# Patient Record
Sex: MALE | Race: WHITE | NOT HISPANIC OR LATINO | Employment: OTHER | URBAN - METROPOLITAN AREA
[De-identification: names, ages, dates, MRNs, and addresses within clinical notes are randomized per-mention and may not be internally consistent; named-entity substitution may affect disease eponyms.]

---

## 2017-07-08 ENCOUNTER — HOSPITAL ENCOUNTER (EMERGENCY)
Facility: CLINIC | Age: 42
Discharge: HOME | End: 2017-07-08
Attending: EMERGENCY MEDICINE | Admitting: EMERGENCY MEDICINE
Payer: COMMERCIAL

## 2017-07-08 VITALS
WEIGHT: 233 LBS | BODY MASS INDEX: 33.36 KG/M2 | DIASTOLIC BLOOD PRESSURE: 90 MMHG | SYSTOLIC BLOOD PRESSURE: 135 MMHG | RESPIRATION RATE: 18 BRPM | OXYGEN SATURATION: 99 % | TEMPERATURE: 98.7 F | HEIGHT: 70 IN

## 2017-07-08 DIAGNOSIS — B86 SCABIES: ICD-10-CM

## 2017-07-08 PROCEDURE — 99282 EMERGENCY DEPT VISIT SF MDM: CPT

## 2017-07-08 RX ORDER — MUPIROCIN CALCIUM 20 MG/G
CREAM TOPICAL 3 TIMES DAILY
Qty: 30 G | Refills: 0 | Status: SHIPPED | OUTPATIENT
Start: 2017-07-08

## 2017-07-08 RX ORDER — HYDROXYZINE HYDROCHLORIDE 25 MG/1
25-50 TABLET, FILM COATED ORAL EVERY 6 HOURS PRN
Qty: 30 TABLET | Refills: 0 | Status: SHIPPED | OUTPATIENT
Start: 2017-07-08

## 2017-07-08 RX ORDER — PERMETHRIN 50 MG/G
CREAM TOPICAL ONCE
Qty: 30 G | Refills: 1 | Status: SHIPPED | OUTPATIENT
Start: 2017-07-08 | End: 2017-07-08

## 2017-07-08 ASSESSMENT — ENCOUNTER SYMPTOMS
FEVER: 0
NAUSEA: 0
VOMITING: 0

## 2017-07-08 NOTE — ED AVS SNAPSHOT
Emergency Department    [address] [facility] [address]    Phone:  [phone]    Fax:  [phone]                                       Mario Lester   MRN: [id number]    Department:   Emergency Department   Date of Visit:  7/8/2017           Patient Information     Date Of Birth          1975        Your diagnoses for this visit were:     Scabies        You were seen by Mark Mojica MD.      Follow-up Information     Follow up with [facility].    Contact information:    [address] [address]  [phone]          Follow up with [facility] In 1 week.    Contact information:    [address] [address]  [phone]          Follow up with  Emergency Department.    Specialty:  EMERGENCY MEDICINE    Why:  If symptoms worsen    Contact information:    [address] [place] [address] [phone]        Discharge Instructions         Scabies  Scabies is a skin infection. It is caused by a tiny parasitic insect, or mite, that is too small to see directly. It can be seen under a microscope, but it is usually recognized only by the rash and symptoms it causes. This can make it hard to diagnose since the signs and symptoms can be similar to other diseases.  The scabies mite tunnels under the skin. It creates a small burrow, where it leaves its eggs. These eggs katz and grow into adults. They then create new burrows over the next 1 to 2 weeks. The mites die in about 4 to 6 weeks. The rash and itching are caused by an allergic reaction to the scabies saliva or feces.  Scabies is highly contagious. It is spread by direct skin contact. It is easily spread by close personal contact, sexual contact, or by sharing bed linens or clothing used by an infected person.  It may take 4 to 6 weeks for symptoms to appear after being exposed. Everyone living in  the house with you, as well as your sexual partners, should be treated at the same time. After the first treatment, you will no longer be contagious. You may return to work, school or .  Home care    Machine wash in hot water all sheets, towels, pillowcases, underwear, pajamas, and any other clothing you have worn lately. Use the hot cycle of a dryer or use a hot iron to sterilize.    Seal anything that is hard to wash in a plastic trash bag for 4 days. This includes coats, jackets, blankets, and bedspreads. (The insects die after 3 days off the human body.)  Medicines  Scabicides  Medicines used to treat scabies are called scabicides. These are creams that kill the scabies mites. A prescription is needed. When using these medicines:    Always follow instructions provided by your healthcare provider and pharmacist. Also follow the printed instructions that come with the medicine.    Talk with your provider about precautions to take when using these medicines.    Use the cream on your body when your skin is cool and dry. Don t use it after a hot shower or bath.    Usually the cream is put on your whole body. This means from your chin all the way down to your toes. Scabies does not usually affect an adult s head. So cream is not needed there. For children, discuss this with your child s provider.    Leave the cream or lotion on for the recommended amount of time. This is usually 8 to 12 hours.    Don t leave cream or lotion on your skin longer than directed. Don t use more than recommended.    Clean clothes should be worn after the treatment.    If you wash your hands after using the cream, you will need to reapply the cream to your hands.    If you are breastfeeding, wash off your nipples before feeding. Then reapply the cream after breastfeeding.    For babies or infants, put mittens on their hands. This will stop them from licking the cream or lotion. It will also stop them from scratching themselves because  of the itching.  Other medicines    An oral medicine called ivermectin may be prescribed for severe cases. It may also be used if you can t apply creams.    Itching may cause the most discomfort. If large areas of your skin are affected, over-the-counter antihistamines may be used to reduce itching. Or you may be given a prescription antihistamine. Some of these medicines may make you sleepy. They are best used at bedtime. Antihistamines that don t make you sleepy can be used during the day. Note: Don t use medicine that has diphenhydramine if you have glaucoma, or if you are a man who has trouble urinating due to an enlarged prostate.    If you were given antibiotics due to a bacterial infection, take them until they are finished. It is important to finish the antibiotics even if the wound looks better. This is to make sure the infection has cleared.  Follow-up care  Follow up with your healthcare provider, or as advised. Call your provider if your symptoms don t improve after 1 week, or if new burrows or rashes appear.  When to seek medical advice  Call your healthcare provider right away if any of these occur:    Yellow-brown crusts or drainage from the sores    Other signs of infection, including increasing redness, swelling, pain, or pus    Fever of 100.4 F (38 C) or higher, or as directed by your provider  Date Last Reviewed: 8/1/2016 2000-2017 [employer]. [address] [place] [address]. All rights reserved. This information is not intended as a substitute for professional medical care. Always follow your healthcare professional's instructions.          Discharge References/Attachments     IMPETIGO, UNDERSTANDING (ENGLISH)      24 Hour Appointment Hotline       To make an appointment at any [facility], call [phone] ([phone]). If you don't have a family doctor or clinic, we will help you find one. [facility] clinics are conveniently located to serve the needs of you  and your family.             Review of your medicines      START taking        Dose / Directions Last dose taken    hydrOXYzine 25 MG tablet   Commonly known as:  ATARAX   Dose:  25-50 mg   Quantity:  30 tablet        Take 1-2 tablets (25-50 mg) by mouth every 6 hours as needed for itching   Refills:  0        mupirocin 2 % cream   Commonly known as:  BACTROBAN   Quantity:  30 g        Apply topically 3 times daily   Refills:  0        permethrin 5 % cream   Commonly known as:  ELIMITE   Quantity:  30 g        Apply topically once for 1 dose Massage into skin from head to foot.  Leave on for 8-14hrs and then wash off.  May repeat in 2 wks if needed.   Refills:  1                Prescriptions were sent or printed at these locations (3 Prescriptions)                   Other Prescriptions                Printed at Department/Unit printer (3 of 3)         hydrOXYzine (ATARAX) 25 MG tablet               mupirocin (BACTROBAN) 2 % cream               permethrin (ELIMITE) 5 % cream                Orders Needing Specimen Collection     None      Pending Results     No orders found from 7/6/2017 to 7/9/2017.            Pending Culture Results     No orders found from 7/6/2017 to 7/9/2017.            Pending Results Instructions     If you had any lab results that were not finalized at the time of your Discharge, you can call the ED Lab Result RN at [phone]. You will be contacted by this team for any positive Lab results or changes in treatment. The nurses are available 7 days a week from 10A to 6:30P.  You can leave a message 24 hours per day and they will return your call.        Test Results From Your Hospital Stay               Clinical Quality Measure: Blood Pressure Screening     Your blood pressure was checked while you were in the emergency department today. The last reading we obtained was  BP: 135/90 . Please read the guidelines below about what these numbers mean and what you should do about them.  If your  "systolic blood pressure (the top number) is less than 120 and your diastolic blood pressure (the bottom number) is less than 80, then your blood pressure is normal. There is nothing more that you need to do about it.  If your systolic blood pressure (the top number) is 120-139 or your diastolic blood pressure (the bottom number) is 80-89, your blood pressure may be higher than it should be. You should have your blood pressure rechecked within a year by a primary care provider.  If your systolic blood pressure (the top number) is 140 or greater or your diastolic blood pressure (the bottom number) is 90 or greater, you may have high blood pressure. High blood pressure is treatable, but if left untreated over time it can put you at risk for heart attack, stroke, or kidney failure. You should have your blood pressure rechecked by a primary care provider within the next 4 weeks.  If your provider in the emergency department today gave you specific instructions to follow-up with your doctor or provider even sooner than that, you should follow that instruction and not wait for up to 4 weeks for your follow-up visit.        Thank you for choosing [facility]       Thank you for choosing [facility] for your care. Our goal is always to provide you with excellent care. Hearing back from our patients is one way we can continue to improve our services. Please take a few minutes to complete the written survey that you may receive in the mail after you visit with us. Thank you!        [employer] Information     [employer] lets you send messages to your doctor, view your test results, renew your prescriptions, schedule appointments and more. To sign up, go to [email or web address] . Click on \"Log in\" on the left side of the screen, which will take you to the Welcome page. Then click on \"Sign up Now\" on the right side of the page.     You will be asked to enter the access code listed below, as well as some personal information. Please " follow the directions to create your username and password.     Your access code is: [email or web address]  Expires: 10/6/2017  2:05 AM     Your access code will  in 90 days. If you need help or a new code, please call your [facility] clinic or [phone].        Care EveryWhere ID     This is your Care EveryWhere ID. This could be used by other organizations to access your [facility] medical records  [id number]        Equal Access to Services     TIAN JOSÉ : Hadii aad ku hadasho Soomaali, waaxda luqadaha, qaybta kaalmada adeegyafidelina, merrick resendiz. So [facility] [phone].    ATENCIÓN: Si habla español, tiene a taylor disposición servicios gratuitos de asistencia lingüística. Llame al [phone].    We comply with applicable federal civil rights laws and [place] laws. We do not discriminate on the basis of race, color, national origin, age, disability sex, sexual orientation or gender identity.            After Visit Summary       This is your record. Keep this with you and show to your community pharmacist(s) and doctor(s) at your next visit.

## 2017-07-08 NOTE — ED PROVIDER NOTES
"  History     Chief Complaint:  Rash     HPI   Mario Lester is a previously healthy 42 year old male who presents with an itchy, raised, blistery rash to his right forearm that he noted yesterday on his left forearm, that he believes is scabies. No other rashes. No systemic signs of illness.     Allergies:  No Known Allergies     Medications:    The patient is currently on no regular medications.     Past Medical History:    History reviewed. No pertinent past medical history.    Past Surgical History:    History reviewed. No pertinent surgical history.    Family History:    History is non-contributory.     Social History:  Marital Status:  Single [1]  Smoking status: former  Alcohol status: 4 cans of beer per week   Patient presents alone.  PCP: Hiren Stephenson      Review of Systems   Constitutional: Negative for fever.   Gastrointestinal: Negative for nausea and vomiting.   Skin: Positive for rash.   All other systems reviewed and are negative.      Physical Exam   First Vitals:  BP: 135/90  Heart Rate: 81  Temp: 98.7  F (37.1  C)  Resp: 18  Height: 177.8 cm (5' 10\")  Weight: 105.7 kg (233 lb)  SpO2: 99 %      Physical Exam  Constitutional:  Alert, answers questions appropriately.  Neck: Normal range of motion.   HEENT: Pupils round, equal.   Pulmonary/Chest: Effort normal.   Neurological: No facial asymmetry, gait intact.   Psychiatric: The patient has a normal mood and affect.  Skin:    3x8 cm patch of erythematous, raised scaling skin with some crusting on the left palmar mid forearm, with proximal excoriations, also scaling and mildly red. Hands and fingers normal bilaterally. No other rash noted.    Emergency Department Course     Emergency Department Course:  Nursing notes and vitals reviewed.  I performed an exam of the patient as documented above.     I discussed the findings and treatment plan with the patient. He expressed understanding of this plan and consented to discharge. He will be discharged " home with instructions for care and follow up. In addition, the patient will return to the emergency department if their symptoms persist, worsen, if new symptoms arise or if there is any concern.  All questions were answered.     Impression & Plan      Medical Decision Making:  Mario Lester is a 42 year old male who presents for evaluation of an itchy rash.  Exam shows findings consistent with scabies vs impetigo. The location of the rash is not consistent with scabies, and there is some yellow crusting on a rash with erythematous base, possibly consistent with impetigo. The patient reports however that his symptoms feel exactly like his last round of scabies several years ago. There is no evidence of a systemic infection, anaphylaxis or systemic illness. The patient will be treated with topical Permethrin cream as well as mupirocin cream.  Hydroxyzine was prescribed for symptom control. The plan is for recheck with primary care in 3 days if not improved.  The patient was instructed in decontamination of their home.    Diagnosis:    ICD-10-CM   1. Scabies B86     Disposition:   Discharge     Discharge Medications:  New Prescriptions    HYDROXYZINE (ATARAX) 25 MG TABLET    Take 1-2 tablets (25-50 mg) by mouth every 6 hours as needed for itching    MUPIROCIN (BACTROBAN) 2 % CREAM    Apply topically 3 times daily    PERMETHRIN (ELIMITE) 5 % CREAM    Apply topically once for 1 dose Massage into skin from head to foot.  Leave on for 8-14hrs and then wash off.  May repeat in 2 wks if needed.       Scribe Disclosure:  Amy MADRID, am serving as a scribe at 2:06 AM on 7/8/2017 to document services personally performed by Mark Mojica MD, based on my observations and the provider's statements to me.     EMERGENCY DEPARTMENT       Mark Mojica MD  07/08/17 0682

## 2017-07-08 NOTE — ED AVS SNAPSHOT
Emergency Department    [address] [place] [address]    Phone:  [phone]    Fax:  [phone]                                       Mario Lester   MRN: [id number]    Department:   Emergency Department   Date of Visit:  7/8/2017           After Visit Summary Signature Page     I have received my discharge instructions, and my questions have been answered. I have discussed any challenges I see with this plan with the nurse or doctor.    ..........................................................................................................................................  Patient/Patient Representative Signature      ..........................................................................................................................................  Patient Representative Print Name and Relationship to Patient    ..................................................               ................................................  Date                                            Time    ..........................................................................................................................................  Reviewed by Signature/Title    ...................................................              ..............................................  Date                                                            Time

## 2017-07-08 NOTE — DISCHARGE INSTRUCTIONS
Scabies  Scabies is a skin infection. It is caused by a tiny parasitic insect, or mite, that is too small to see directly. It can be seen under a microscope, but it is usually recognized only by the rash and symptoms it causes. This can make it hard to diagnose since the signs and symptoms can be similar to other diseases.  The scabies mite tunnels under the skin. It creates a small burrow, where it leaves its eggs. These eggs katz and grow into adults. They then create new burrows over the next 1 to 2 weeks. The mites die in about 4 to 6 weeks. The rash and itching are caused by an allergic reaction to the scabies saliva or feces.  Scabies is highly contagious. It is spread by direct skin contact. It is easily spread by close personal contact, sexual contact, or by sharing bed linens or clothing used by an infected person.  It may take 4 to 6 weeks for symptoms to appear after being exposed. Everyone living in the house with you, as well as your sexual partners, should be treated at the same time. After the first treatment, you will no longer be contagious. You may return to work, school or .  Home care    Machine wash in hot water all sheets, towels, pillowcases, underwear, pajamas, and any other clothing you have worn lately. Use the hot cycle of a dryer or use a hot iron to sterilize.    Seal anything that is hard to wash in a plastic trash bag for 4 days. This includes coats, jackets, blankets, and bedspreads. (The insects die after 3 days off the human body.)  Medicines  Scabicides  Medicines used to treat scabies are called scabicides. These are creams that kill the scabies mites. A prescription is needed. When using these medicines:    Always follow instructions provided by your healthcare provider and pharmacist. Also follow the printed instructions that come with the medicine.    Talk with your provider about precautions to take when using these medicines.    Use the cream on your body when your  skin is cool and dry. Don t use it after a hot shower or bath.    Usually the cream is put on your whole body. This means from your chin all the way down to your toes. Scabies does not usually affect an adult s head. So cream is not needed there. For children, discuss this with your child s provider.    Leave the cream or lotion on for the recommended amount of time. This is usually 8 to 12 hours.    Don t leave cream or lotion on your skin longer than directed. Don t use more than recommended.    Clean clothes should be worn after the treatment.    If you wash your hands after using the cream, you will need to reapply the cream to your hands.    If you are breastfeeding, wash off your nipples before feeding. Then reapply the cream after breastfeeding.    For babies or infants, put mittens on their hands. This will stop them from licking the cream or lotion. It will also stop them from scratching themselves because of the itching.  Other medicines    An oral medicine called ivermectin may be prescribed for severe cases. It may also be used if you can t apply creams.    Itching may cause the most discomfort. If large areas of your skin are affected, over-the-counter antihistamines may be used to reduce itching. Or you may be given a prescription antihistamine. Some of these medicines may make you sleepy. They are best used at bedtime. Antihistamines that don t make you sleepy can be used during the day. Note: Don t use medicine that has diphenhydramine if you have glaucoma, or if you are a man who has trouble urinating due to an enlarged prostate.    If you were given antibiotics due to a bacterial infection, take them until they are finished. It is important to finish the antibiotics even if the wound looks better. This is to make sure the infection has cleared.  Follow-up care  Follow up with your healthcare provider, or as advised. Call your provider if your symptoms don t improve after 1 week, or if new burrows  or rashes appear.  When to seek medical advice  Call your healthcare provider right away if any of these occur:    Yellow-brown crusts or drainage from the sores    Other signs of infection, including increasing redness, swelling, pain, or pus    Fever of 100.4 F (38 C) or higher, or as directed by your provider  Date Last Reviewed: 8/1/2016 2000-2017 [employer]. [address] [place] [address]. All rights reserved. This information is not intended as a substitute for professional medical care. Always follow your healthcare professional's instructions.

## 2018-06-24 ENCOUNTER — HOSPITAL ENCOUNTER (EMERGENCY)
Facility: CLINIC | Age: 43
Discharge: HOME | End: 2018-06-25
Attending: EMERGENCY MEDICINE | Admitting: EMERGENCY MEDICINE
Payer: COMMERCIAL

## 2018-06-24 DIAGNOSIS — S01.81XA FACIAL LACERATION, INITIAL ENCOUNTER: ICD-10-CM

## 2018-06-24 PROCEDURE — 99283 EMERGENCY DEPT VISIT LOW MDM: CPT

## 2018-06-24 PROCEDURE — 12013 RPR F/E/E/N/L/M 2.6-5.0 CM: CPT

## 2018-06-24 NOTE — ED AVS SNAPSHOT
Emergency Department    [address] [facility] [address]    Phone:  [phone]    Fax:  [phone]                                       Mario Lester   MRN: [id number]    Department:   Emergency Department   Date of Visit:  6/24/2018           Patient Information     Date Of Birth          1975        Your diagnoses for this visit were:     Facial laceration, initial encounter        You were seen by Melissa Garcia MD.      Follow-up Information     Schedule an appointment as soon as possible for a visit with Hiren Stephenson.    Specialty:  Family Practice    Why:  For suture removal    Contact information:    [facility]  [address]  [place] [address] [phone]          Follow up with  Emergency Department.    Specialty:  EMERGENCY MEDICINE    Contact information:    [address] [facility] [id number] [phone]        Discharge Instructions       Keep dry for first 24 hours then can get wet  Apply vaseline or bacitracin on wound daily  Sutures should dissolve, if not dissolved by 5-7 days, have them removed if it is healed    Discharge Instructions  Laceration (Cut)    You were seen today for a laceration (cut).  Your provider examined your laceration for any problems such a buried foreign body (like glass, a splinter, or gravel), or injury to blood vessels, tendons, and nerves.  Your provider may have also rinsed and/or scrubbed your laceration to help prevent an infection. It may not be possible to find all problems with your laceration on the first visit; occasionally foreign bodies or a tendon injury can go undetected.    Your laceration may have been closed in one of several ways:    No closure: many wounds will heal just fine without closure.    Stitches: regular stitches that require removal.    Staples: skin staples are often used in the scalp/head.    Wound adhesive (glue): skin glue can be used for certain  lacerations and doesn t require removal.    Wound strips (aka Butterfly bandages or steri-strips): these are bandages that help to close a wound.    Absorbable stitches:  dissolving  stitches that go away on their own and usually don t require removal.    A small percentage of wounds will develop an infection regardless of how well the wound is cared for. Antibiotics are generally not indicated to prevent an infection so are only given for a small number of high-risk wounds. Some lacerations are too high risk to close, and are left open to heal because closure can increase the likelihood that an infection will develop.    Remember that all lacerations, no matter how expertly repaired, will cause scarring. We consider many factors, techniques, and materials, in our efforts to provide the best possible cosmetic outcome.    Generally, every Emergency Department visit should have a follow-up clinic visit with either a primary or a specialty clinic/provider. Please follow-up as instructed by your emergency provider today.     Return to the Emergency Department right away if:    You have more redness, swelling, pain, drainage (pus), a bad smell, or red streaking from your laceration as these symptoms could indicate an infection.    You have a fever of 100.4 F or more.    You have bleeding that you cannot stop at home. If your cut starts to bleed, hold pressure on the bleeding area with a clean cloth or put pressure over the bandage.  If the bleeding does not stop after using constant pressure for 30 minutes, you should return to the Emergency Department for further treatment.    An area past the laceration is cool, pale, or blue compared with the other side, or has a slower return of color when squeezed.    Your dressing seems too tight or starts to get uncomfortable or painful. For children, signs of a problem might be irritability or restlessness.    You have loss of normal function or use of an area, such as being  unable to straighten or bend a finger normally.    You have a numb area past the laceration.    Return to the Emergency Department or see your regular provider if:    The laceration starts to come open.     You have something coming out of the cut or a feeling that there is something in the laceration.    Your wound will not heal, or keeps breaking open. There can always be glass, wood, dirt or other things in any wound.  They will not always show up, even on x-rays.  If a wound does not heal, this may be why, and it is important to follow-up with your regular provider.    Home Care:    Take your dressing off in 12-24 hours, or as instructed by your provider, to check your laceration. Remove the dressing sooner if it seems too tight or painful, or if it is getting numb, tingly, or pale past the dressing.    Gently wash your laceration 1-2 times daily with clean water and mild soap. It is okay to shower or run clean water over the laceration, but do not let the laceration soak in water (no swimming).    If your laceration was closed with wound adhesive or strips: pat it dry and leave it open to the air. For all other repairs: after you wash your laceration, or at least 2 times a day, apply antibiotic ointment (such as Neosporin  or Bacitracin ) to the laceration, then cover it with a Band-Aid  or gauze.    Keep the laceration clean. Wear gloves or other protective clothing if you are around dirt.    Follow-up for removal:    If your wound was closed with staples or regular stitches, they need to be removed according to the instructions and timeline specified by your provider today.    If your wound was closed with absorbable ( dissolving ) sutures, they should fall out, dissolve, or not be visible in about one week. If they are still visible, then they should be removed according to the instructions and timeline specified by your provider today.    Scars:  To help minimize scarring:    Wear sunscreen over the healed  laceration when out in the sun.    Massage the area regularly once healed.    You may apply Vitamin E to the healed wound.    Wait. Scars improve in appearance over months and years.    If you were given a prescription for medicine here today, be sure to read all of the information (including the package insert) that comes with your prescription.  This will include important information about the medicine, its side effects, and any warnings that you need to know about.  The pharmacist who fills the prescription can provide more information and answer questions you may have about the medicine.  If you have questions or concerns that the pharmacist cannot address, please call or return to the Emergency Department.       Remember that you can always come back to the Emergency Department if you are not able to see your regular provider in the amount of time listed above, if you get any new symptoms, or if there is anything that worries you.      24 Hour Appointment Hotline       To make an appointment at any [facility], call [phone] ([phone]). If you don't have a family doctor or clinic, we will help you find one. [facility] clinics are conveniently located to serve the needs of you and your family.             Review of your medicines      Our records show that you are taking the medicines listed below. If these are incorrect, please call your family doctor or clinic.        Dose / Directions Last dose taken    hydrOXYzine 25 MG tablet   Commonly known as:  ATARAX   Dose:  25-50 mg   Quantity:  30 tablet        Take 1-2 tablets (25-50 mg) by mouth every 6 hours as needed for itching   Refills:  0        mupirocin 2 % cream   Commonly known as:  BACTROBAN   Quantity:  30 g        Apply topically 3 times daily   Refills:  0                Orders Needing Specimen Collection     None      Pending Results     No orders found for last 3 day(s).            Pending Culture Results     No orders found for last  3 day(s).            Pending Results Instructions     If you had any lab results that were not finalized at the time of your Discharge, you can call the ED Lab Result RN at [phone]. You will be contacted by this team for any positive Lab results or changes in treatment. The nurses are available 7 days a week from 10A to 6:30P.  You can leave a message 24 hours per day and they will return your call.        Test Results From Your Hospital Stay               Clinical Quality Measure: Blood Pressure Screening     Your blood pressure was checked while you were in the emergency department today. The last reading we obtained was  BP: 151/84 . Please read the guidelines below about what these numbers mean and what you should do about them.  If your systolic blood pressure (the top number) is less than 120 and your diastolic blood pressure (the bottom number) is less than 80, then your blood pressure is normal. There is nothing more that you need to do about it.  If your systolic blood pressure (the top number) is 120-139 or your diastolic blood pressure (the bottom number) is 80-89, your blood pressure may be higher than it should be. You should have your blood pressure rechecked within a year by a primary care provider.  If your systolic blood pressure (the top number) is 140 or greater or your diastolic blood pressure (the bottom number) is 90 or greater, you may have high blood pressure. High blood pressure is treatable, but if left untreated over time it can put you at risk for heart attack, stroke, or kidney failure. You should have your blood pressure rechecked by a primary care provider within the next 4 weeks.  If your provider in the emergency department today gave you specific instructions to follow-up with your doctor or provider even sooner than that, you should follow that instruction and not wait for up to 4 weeks for your follow-up visit.        Thank you for choosing [facility]       Thank you for  "choosing [facility] for your care. Our goal is always to provide you with excellent care. Hearing back from our patients is one way we can continue to improve our services. Please take a few minutes to complete the written survey that you may receive in the mail after you visit with us. Thank you!        [employer] Information     [employer] lets you send messages to your doctor, view your test results, renew your prescriptions, schedule appointments and more. To sign up, go to [email or web address] . Click on \"Log in\" on the left side of the screen, which will take you to the Welcome page. Then click on \"Sign up Now\" on the right side of the page.     You will be asked to enter the access code listed below, as well as some personal information. Please follow the directions to create your username and password.     Your access code is: [email or web address]  Expires: 2018  1:25 AM     Your access code will  in 90 days. If you need help or a new code, please call your [facility] clinic or [phone].        Care EveryWhere ID     This is your Care EveryWhere ID. This could be used by other organizations to access your [facility] medical records  [id number]        Equal Access to Services     DUTCH JOSÉ : Silva Zambrano, oscar auguste, layo valerio, merrick resendiz. So [facility] [phone].    ATENCIÓN: Si habla español, tiene a taylor disposición servicios gratuitos de asistencia lingüística. Llame al [phone].    We comply with applicable federal civil rights laws and [place] laws. We do not discriminate on the basis of race, color, national origin, age, disability, sex, sexual orientation, or gender identity.            After Visit Summary       This is your record. Keep this with you and show to your community pharmacist(s) and doctor(s) at your next visit.                  "

## 2018-06-24 NOTE — ED AVS SNAPSHOT
Emergency Department    [address] [place] [address]    Phone:  [phone]    Fax:  [phone]                                       Mario Lester   MRN: [id number]    Department:   Emergency Department   Date of Visit:  6/24/2018           After Visit Summary Signature Page     I have received my discharge instructions, and my questions have been answered. I have discussed any challenges I see with this plan with the nurse or doctor.    ..........................................................................................................................................  Patient/Patient Representative Signature      ..........................................................................................................................................  Patient Representative Print Name and Relationship to Patient    ..................................................               ................................................  Date                                            Time    ..........................................................................................................................................  Reviewed by Signature/Title    ...................................................              ..............................................  Date                                                            Time

## 2018-06-25 VITALS
HEIGHT: 70 IN | TEMPERATURE: 98.5 F | RESPIRATION RATE: 18 BRPM | BODY MASS INDEX: 35.79 KG/M2 | DIASTOLIC BLOOD PRESSURE: 78 MMHG | SYSTOLIC BLOOD PRESSURE: 140 MMHG | OXYGEN SATURATION: 98 % | HEART RATE: 89 BPM | WEIGHT: 250 LBS

## 2018-06-25 NOTE — ED PROVIDER NOTES
"  History     Chief Complaint:    Laceration     HPI   Mario Lester is a 43 year old male who presents with laceration. Sawing a large branch from a tree and a small branch snapped and hit him in the face.  No loss of consciousness, headache or vomiting.  No other injuries.  Tetanus in 2015.     Allergies:   None    Medications:      hydrOXYzine (ATARAX) 25 MG tablet   mupirocin (BACTROBAN) 2 % cream       Past Medical History:    No pertinent medical history    Past Surgical History:    No pertinent surgical history    Family History:    No pertinent family history    Social History:   reports that he has quit smoking. He does not have any smokeless tobacco history on file. He reports that he drinks about 2.4 oz of alcohol per week  He reports that he does not use illicit drugs.    PCP: Hiren Stephenson     Review of Systems  10 point review of systems was obtained and negative other than mentioned above.      Physical Exam     Patient Vitals for the past 24 hrs:   BP Temp Temp src Pulse Heart Rate Resp SpO2 Height Weight   06/25/18 0140 140/78 98.5  F (36.9  C) Oral 89 - 18 98 % - -   06/24/18 2325 151/84 98.5  F (36.9  C) Oral - 91 - 97 % 1.778 m (5' 10\") 113.4 kg (250 lb)        Physical Exam  General: Resting comfortably on the gurney  Eyes:  The pupils are equal and round    Conjunctivae and sclerae are normal  ENT:    Laceration on left side of face extending from corner of mouth up to mid cheek    No facial bone tenderness    No dental trauma  Neck:  Normal range of motion  CV:  Regular rate and rhythm    Skin warm and well perfused   Resp:  Non labored breathing on room air  MS:  Normal muscular tone  Skin:  See ENT exam above  Neuro:   Awake, alert.      GCS 15    Speech is normal and fluent.    Face is symmetric.     Moves all extremities  Psych: Normal affect.  Appropriate interactions.    Emergency Department Course     Procedures:  [facility] Procedure Note        Laceration Repair:  "   Performed by: Melissa Garcia MD  Authorized by: Melissa Garcia MD  Consent given by: Patient who states understanding of the procedure being performed after discussing the risks, benefits and alternatives.    Preparation: Patient was prepped and draped in usual sterile fashion.  Irrigation solution: saline    Body area:face  Laceration length: 4cm  Contamination: The wound is not contaminated.  Foreign bodies:none  Tendon involvement: none  Anesthesia: Local  Local anesthetic: Lidocaine     1%, with epinephrine  Anesthetic total: 3ml    Debridement: none  Skin closure: Closed with 5 x 5.0 fast absorbing gut  Technique: interrupted  Approximation: close  Approximation difficulty: simple    Patient tolerance: Patient tolerated the procedure well with no immediate complications.     Emergency Department Course:  Past medical records, nursing notes, and vitals reviewed.  I performed an exam of the patient and obtained history, as documented above.    I rechecked the patient.     Impression & Plan      Medical Decision Making:  Mario Lester is a 43 year old male presented to the Emergency Department with a laceration.  Given the time of the injury, the wound was felt amenable to primary closure.  After adequate anesthesia was obtained, the wound was thoroughly irrigated and examined.  There is no evidence of muscular, tendon, or bony involvement at this time, nor signs or symptoms of neurovascular compromise.  Additionally, given the mechanism of injury and examination, suspicion for a foreign body was low and imaging was not done.   Wound was repaired as outlined above in the procedure note.    We discussed appropriate wound care instructions including keeping the wound dry for the first 24-48 hours, followed be gentle cleansing with soap and water.  We discussed application of sunscreen to the affected area once scar has formed, to minimize long term scar formation.  Warning signs of infection (erythema,  warmth, worsening pain, drainage of pus) were discussed, which should prompt return to the ER for re-evaluation and the patient verbalized understanding.  Sutures absorbable. Patient was encouraged to return to the ER or follow-up with PCP in the meantime should any new or troubling symptoms develop.No indication for imaging of facial bones or head based on exam/history.    Diagnosis:    ICD-10-CM    1. Facial laceration, initial encounter S01.81XA       Discharge Medications:  New Prescriptions    No medications on file        6/24/2018   Melissa Garcia MD Goertz, Maria Kristine, MD  06/25/18 0428

## 2018-06-25 NOTE — DISCHARGE INSTRUCTIONS
Keep dry for first 24 hours then can get wet  Apply vaseline or bacitracin on wound daily  Sutures should dissolve, if not dissolved by 5-7 days, have them removed if it is healed    Discharge Instructions  Laceration (Cut)    You were seen today for a laceration (cut).  Your provider examined your laceration for any problems such a buried foreign body (like glass, a splinter, or gravel), or injury to blood vessels, tendons, and nerves.  Your provider may have also rinsed and/or scrubbed your laceration to help prevent an infection. It may not be possible to find all problems with your laceration on the first visit; occasionally foreign bodies or a tendon injury can go undetected.    Your laceration may have been closed in one of several ways:    No closure: many wounds will heal just fine without closure.    Stitches: regular stitches that require removal.    Staples: skin staples are often used in the scalp/head.    Wound adhesive (glue): skin glue can be used for certain lacerations and doesn t require removal.    Wound strips (aka Butterfly bandages or steri-strips): these are bandages that help to close a wound.    Absorbable stitches:  dissolving  stitches that go away on their own and usually don t require removal.    A small percentage of wounds will develop an infection regardless of how well the wound is cared for. Antibiotics are generally not indicated to prevent an infection so are only given for a small number of high-risk wounds. Some lacerations are too high risk to close, and are left open to heal because closure can increase the likelihood that an infection will develop.    Remember that all lacerations, no matter how expertly repaired, will cause scarring. We consider many factors, techniques, and materials, in our efforts to provide the best possible cosmetic outcome.    Generally, every Emergency Department visit should have a follow-up clinic visit with either a primary or a specialty  clinic/provider. Please follow-up as instructed by your emergency provider today.     Return to the Emergency Department right away if:    You have more redness, swelling, pain, drainage (pus), a bad smell, or red streaking from your laceration as these symptoms could indicate an infection.    You have a fever of 100.4 F or more.    You have bleeding that you cannot stop at home. If your cut starts to bleed, hold pressure on the bleeding area with a clean cloth or put pressure over the bandage.  If the bleeding does not stop after using constant pressure for 30 minutes, you should return to the Emergency Department for further treatment.    An area past the laceration is cool, pale, or blue compared with the other side, or has a slower return of color when squeezed.    Your dressing seems too tight or starts to get uncomfortable or painful. For children, signs of a problem might be irritability or restlessness.    You have loss of normal function or use of an area, such as being unable to straighten or bend a finger normally.    You have a numb area past the laceration.    Return to the Emergency Department or see your regular provider if:    The laceration starts to come open.     You have something coming out of the cut or a feeling that there is something in the laceration.    Your wound will not heal, or keeps breaking open. There can always be glass, wood, dirt or other things in any wound.  They will not always show up, even on x-rays.  If a wound does not heal, this may be why, and it is important to follow-up with your regular provider.    Home Care:    Take your dressing off in 12-24 hours, or as instructed by your provider, to check your laceration. Remove the dressing sooner if it seems too tight or painful, or if it is getting numb, tingly, or pale past the dressing.    Gently wash your laceration 1-2 times daily with clean water and mild soap. It is okay to shower or run clean water over the laceration,  but do not let the laceration soak in water (no swimming).    If your laceration was closed with wound adhesive or strips: pat it dry and leave it open to the air. For all other repairs: after you wash your laceration, or at least 2 times a day, apply antibiotic ointment (such as Neosporin  or Bacitracin ) to the laceration, then cover it with a Band-Aid  or gauze.    Keep the laceration clean. Wear gloves or other protective clothing if you are around dirt.    Follow-up for removal:    If your wound was closed with staples or regular stitches, they need to be removed according to the instructions and timeline specified by your provider today.    If your wound was closed with absorbable ( dissolving ) sutures, they should fall out, dissolve, or not be visible in about one week. If they are still visible, then they should be removed according to the instructions and timeline specified by your provider today.    Scars:  To help minimize scarring:    Wear sunscreen over the healed laceration when out in the sun.    Massage the area regularly once healed.    You may apply Vitamin E to the healed wound.    Wait. Scars improve in appearance over months and years.    If you were given a prescription for medicine here today, be sure to read all of the information (including the package insert) that comes with your prescription.  This will include important information about the medicine, its side effects, and any warnings that you need to know about.  The pharmacist who fills the prescription can provide more information and answer questions you may have about the medicine.  If you have questions or concerns that the pharmacist cannot address, please call or return to the Emergency Department.       Remember that you can always come back to the Emergency Department if you are not able to see your regular provider in the amount of time listed above, if you get any new symptoms, or if there is anything that worries you.

## 2020-04-22 ENCOUNTER — HOSPITAL ENCOUNTER (EMERGENCY)
Facility: CLINIC | Age: 45
Discharge: HOME | End: 2020-04-22
Attending: EMERGENCY MEDICINE | Admitting: EMERGENCY MEDICINE
Payer: COMMERCIAL

## 2020-04-22 ENCOUNTER — APPOINTMENT (OUTPATIENT)
Dept: MRI IMAGING | Facility: CLINIC | Age: 45
End: 2020-04-22
Attending: EMERGENCY MEDICINE
Payer: COMMERCIAL

## 2020-04-22 VITALS
TEMPERATURE: 98.2 F | WEIGHT: 240 LBS | RESPIRATION RATE: 16 BRPM | SYSTOLIC BLOOD PRESSURE: 142 MMHG | OXYGEN SATURATION: 97 % | HEART RATE: 88 BPM | DIASTOLIC BLOOD PRESSURE: 92 MMHG | BODY MASS INDEX: 33.6 KG/M2 | HEIGHT: 71 IN

## 2020-04-22 DIAGNOSIS — F10.10 ALCOHOL ABUSE: ICD-10-CM

## 2020-04-22 DIAGNOSIS — R20.2 PARESTHESIA OF LEFT FOOT: ICD-10-CM

## 2020-04-22 LAB
ANION GAP SERPL CALCULATED.3IONS-SCNC: 8 MMOL/L (ref 3–14)
BASOPHILS # BLD AUTO: 0 10E9/L (ref 0–0.2)
BASOPHILS NFR BLD AUTO: 0.1 %
BUN SERPL-MCNC: 16 MG/DL (ref 7–30)
CALCIUM SERPL-MCNC: 9.3 MG/DL (ref 8.5–10.1)
CHLORIDE SERPL-SCNC: 105 MMOL/L (ref 94–109)
CO2 SERPL-SCNC: 23 MMOL/L (ref 20–32)
CREAT SERPL-MCNC: 0.68 MG/DL (ref 0.66–1.25)
DIFFERENTIAL METHOD BLD: ABNORMAL
EOSINOPHIL # BLD AUTO: 0 10E9/L (ref 0–0.7)
EOSINOPHIL NFR BLD AUTO: 0.1 %
ERYTHROCYTE [DISTWIDTH] IN BLOOD BY AUTOMATED COUNT: 12.9 % (ref 10–15)
ETHANOL SERPL-MCNC: <0.01 G/DL
GFR SERPL CREATININE-BSD FRML MDRD: >90 ML/MIN/{1.73_M2}
GLUCOSE SERPL-MCNC: 90 MG/DL (ref 70–99)
HCT VFR BLD AUTO: 42.9 % (ref 40–53)
HGB BLD-MCNC: 14.7 G/DL (ref 13.3–17.7)
IMM GRANULOCYTES # BLD: 0.1 10E9/L (ref 0–0.4)
IMM GRANULOCYTES NFR BLD: 0.3 %
LYMPHOCYTES # BLD AUTO: 2.5 10E9/L (ref 0.8–5.3)
LYMPHOCYTES NFR BLD AUTO: 17.1 %
MCH RBC QN AUTO: 30.3 PG (ref 26.5–33)
MCHC RBC AUTO-ENTMCNC: 34.3 G/DL (ref 31.5–36.5)
MCV RBC AUTO: 89 FL (ref 78–100)
MONOCYTES # BLD AUTO: 1.4 10E9/L (ref 0–1.3)
MONOCYTES NFR BLD AUTO: 10 %
NEUTROPHILS # BLD AUTO: 10.5 10E9/L (ref 1.6–8.3)
NEUTROPHILS NFR BLD AUTO: 72.4 %
NRBC # BLD AUTO: 0 10*3/UL
NRBC BLD AUTO-RTO: 0 /100
PLATELET # BLD AUTO: 228 10E9/L (ref 150–450)
POTASSIUM SERPL-SCNC: 4 MMOL/L (ref 3.4–5.3)
RBC # BLD AUTO: 4.85 10E12/L (ref 4.4–5.9)
SODIUM SERPL-SCNC: 136 MMOL/L (ref 133–144)
WBC # BLD AUTO: 14.4 10E9/L (ref 4–11)

## 2020-04-22 PROCEDURE — 80320 DRUG SCREEN QUANTALCOHOLS: CPT | Performed by: EMERGENCY MEDICINE

## 2020-04-22 PROCEDURE — 85025 COMPLETE CBC W/AUTO DIFF WBC: CPT | Performed by: EMERGENCY MEDICINE

## 2020-04-22 PROCEDURE — 99284 EMERGENCY DEPT VISIT MOD MDM: CPT | Mod: 25

## 2020-04-22 PROCEDURE — 80048 BASIC METABOLIC PNL TOTAL CA: CPT | Performed by: EMERGENCY MEDICINE

## 2020-04-22 PROCEDURE — 70551 MRI BRAIN STEM W/O DYE: CPT

## 2020-04-22 ASSESSMENT — ENCOUNTER SYMPTOMS
SHORTNESS OF BREATH: 0
BACK PAIN: 0
NUMBNESS: 1
FEVER: 0
ABDOMINAL PAIN: 0
DIFFICULTY URINATING: 0

## 2020-04-22 ASSESSMENT — MIFFLIN-ST. JEOR: SCORE: 1997.82

## 2020-04-22 NOTE — ED AVS SNAPSHOT
Emergency Department  [address] [place] [address]  Phone:  [phone]  Fax:  [phone]                                    Mario Lester   MRN: [id number]    Department:   Emergency Department   Date of Visit:  4/22/2020           After Visit Summary Signature Page    I have received my discharge instructions, and my questions have been answered. I have discussed any challenges I see with this plan with the nurse or doctor.    ..........................................................................................................................................  Patient/Patient Representative Signature      ..........................................................................................................................................  Patient Representative Print Name and Relationship to Patient    ..................................................               ................................................  Date                                   Time    ..........................................................................................................................................  Reviewed by Signature/Title    ...................................................              ..............................................  Date                                               Time          22EPIC Rev 08/18

## 2020-04-22 NOTE — ED TRIAGE NOTES
Pt states left foot limp with numbness - starting this morning while out in the garage - pt ambulated with pain - denies any other symptoms

## 2020-04-22 NOTE — ED PROVIDER NOTES
"  History     Chief Complaint:    Numbness      HPI   Mario Lester is a 45 year old male who presents with left foot \"numbness\". Patient notes that he drinks a 6-pack of beer per day and developed left foot numbness around 3:00 AM last night after he had been sitting on his left foot for over an hour. He went to bed and woke up and had persistent paresthesia to his left foot. No history of trauma. No fever or other symptoms reported. No history of diabetes.    Allergies:  No Known Drug Allergies     Medications:    The patient is currently on no regular medications.     Past Medical History:    Chemical dependency    Past Surgical History:    History reviewed. No pertinent past surgical history.     Family History:    History reviewed. No pertinent family history.      Social History:  The patient was not accompanied to the ED  Smoking Status: Former smoker  Smokeless Tobacco: No  Alcohol Use: Yes    Marital Status:  Single      Review of Systems   Constitutional: Negative for fever.   Respiratory: Negative for shortness of breath.    Cardiovascular: Negative for chest pain.   Gastrointestinal: Negative for abdominal pain.   Genitourinary: Negative for difficulty urinating.   Musculoskeletal: Negative for back pain.   Neurological: Positive for numbness (Left foot).   All other systems reviewed and are negative.    Physical Exam     Patient Vitals for the past 24 hrs:   BP Temp Temp src Pulse Heart Rate Resp SpO2 Height Weight   04/22/20 1100 (!) 135/94 -- -- 88 -- -- 96 % -- --   04/22/20 1045 (!) 145/92 -- -- 82 -- -- 96 % -- --   04/22/20 1000 (!) 174/107 -- -- 92 -- -- -- -- --   04/22/20 0945 (!) 157/86 -- -- 93 -- -- 96 % -- --   04/22/20 0930 (!) 163/82 -- -- 93 -- -- 98 % -- --   04/22/20 0832 (!) 159/103 98.2  F (36.8  C) Oral 110 110 16 95 % 1.791 m (5' 10.5\") 108.9 kg (240 lb)       Physical Exam  General: Alert and cooperative with exam. Patient in mild distress. Normal mentation. Anxious " appearance  Head:  Scalp is NC/AT  Eyes:  No scleral icterus, PERRL, EOMI   ENT:  The external nose and ears are normal. The oropharynx is normal and without erythema; mucus membranes are moist.   Neck:  Normal range of motion without rigidity.  CV:  Regular rate and rhythm    Resp:  Breath sounds are clear bilaterally    Non-labored, no retractions or accessory muscle use  GI:  Abdomen is soft, no distension, no tenderness. No peritoneal signs  MS:  No lower extremity edema     No midline cervical, thoracic, or lumbar tenderness  Skin:  Warm and dry, No rash or lesions noted.  Neuro: Oriented x 3. No gross motor deficits.    Strength and sensation grossly intact in all 4 extremities with exception of paresthesia reported to left foot, no swelling or skin changes. No foot drop     Cranial nerves 2-12 intact.    GCS: 15. Normal LE reflexes    Gait normal          Emergency Department Course     Imaging:  Radiology findings were communicated with the patient who voiced understanding of the findings.    MR Brain w/o Contrast:  pending  Report per radiology.    Laboratory:  Laboratory findings were communicated with the patient who voiced understanding of the findings.    CBC: WBC 14.4 (H), HGB 14.7,   BMP: AWNL (Creatinine 0.68)    Alcohol ethyl: <0.01     Procedures  None.    Emergency Department Course:  Past medical records, nursing notes, and vitals reviewed.    0841: I performed an exam of the patient as documented above.     IV was inserted and blood was drawn for laboratory testing, results above.    1116: I spoke with Dr. Patel of the Neurology service regarding patient's presentation, findings, and plan of care.    The patient was sent for a brain MRI while in the emergency department, results above.     1314: I rechecked the patient and discussed the results of his workup thus far.     The patient was signed out to Dr. Valdes, oncoming ED physician, pending MRI results    I personally reviewed  the laboratory results with the Patient and answered all related questions prior to sign out.     Impression & Plan     Medical Decision Making:  Patient is a 45-year-old male who presents with left foot paresthesia. Patient's medical history and records were reviewed. On evaluation, the patient reports paresthesia to his left foot and ankle without overlying skin changes. Patient has normal pulses and no evidence of infection or concern for DVT.  There is no evidence of trauma or indication for imaging of the foot. Neurologic exam is otherwise unremarkable. Labs notable for mildly elevated white count; patient without infectious symptoms. Case was discussed with the stroke neurology fellow who recommended brain MRI without contrast for further evaluation; this is pending. Patient has no back pain or history of injury to necessitate lumbar imaging at this time; no red flag symptoms. Patient has no lower extremity weakness or evidence of foot drop. He will be signed out to my partner, Dr. Valdes, pending MRI results. If MRI is negative, patient can follow-up as an outpatient with the [facility] and neurology. Return precautions were discussed with the patient. It is possible patient may be experiencing neuropathy secondary to daily alcohol usage (alcohol use counseling provided to the patient) or possible neuropraxia secondary to compression from prolonged sitting on his foot last night.    Diagnosis:    ICD-10-CM    1. Paresthesia of left foot  R20.2    2. Alcohol abuse  F10.10        Disposition:  Signed out to Dr. Valdes, pending MRI results      Scribe Disclosure:  Meghan MADRID, am serving as a scribe at 8:41 AM on 4/22/2020 to document services personally performed by Cristobal Morales DO based on my observations and the provider's statements to me.      4/22/2020    EMERGENCY DEPARTMENT       Cristobal Morales DO  04/22/20 4222

## 2020-04-22 NOTE — ED NOTES
Patient called requesting results of the MRI this morning and stating that he had to leave before the results were known.  I conferred with Dr Valdes and a normal MRI result was relayed to the patient.  AVS was printed and patient states that he will come by to pick it up.

## 2020-12-28 ENCOUNTER — OFFICE VISIT - HEALTHEAST (OUTPATIENT)
Dept: FAMILY MEDICINE | Facility: CLINIC | Age: 45
End: 2020-12-28

## 2020-12-28 DIAGNOSIS — M95.4 RIB DEFORMITY: ICD-10-CM

## 2020-12-28 DIAGNOSIS — I10 ESSENTIAL HYPERTENSION, BENIGN: ICD-10-CM

## 2020-12-28 DIAGNOSIS — R91.1 PULMONARY NODULE, LEFT: ICD-10-CM

## 2020-12-28 ASSESSMENT — MIFFLIN-ST. JEOR: SCORE: 2118.7

## 2021-06-05 VITALS
SYSTOLIC BLOOD PRESSURE: 159 MMHG | BODY MASS INDEX: 34.33 KG/M2 | DIASTOLIC BLOOD PRESSURE: 96 MMHG | WEIGHT: 259 LBS | HEIGHT: 73 IN | HEART RATE: 102 BPM | TEMPERATURE: 100.4 F

## 2021-06-16 PROBLEM — I10 ESSENTIAL HYPERTENSION, BENIGN: Status: ACTIVE | Noted: 2020-12-28

## 2022-10-07 ENCOUNTER — HOSPITAL ENCOUNTER (EMERGENCY)
Facility: CLINIC | Age: 47
Discharge: HOME | End: 2022-10-07
Attending: EMERGENCY MEDICINE | Admitting: EMERGENCY MEDICINE
Payer: COMMERCIAL

## 2022-10-07 VITALS
TEMPERATURE: 97.7 F | DIASTOLIC BLOOD PRESSURE: 93 MMHG | OXYGEN SATURATION: 99 % | RESPIRATION RATE: 16 BRPM | SYSTOLIC BLOOD PRESSURE: 157 MMHG | HEART RATE: 67 BPM

## 2022-10-07 DIAGNOSIS — M70.21 OLECRANON BURSITIS OF RIGHT ELBOW: ICD-10-CM

## 2022-10-07 PROCEDURE — 99284 EMERGENCY DEPT VISIT MOD MDM: CPT

## 2022-10-07 RX ORDER — CEPHALEXIN 500 MG/1
500 CAPSULE ORAL 4 TIMES DAILY
Qty: 40 CAPSULE | Refills: 0 | Status: SHIPPED | OUTPATIENT
Start: 2022-10-07 | End: 2022-10-15

## 2022-10-07 RX ORDER — SULFAMETHOXAZOLE/TRIMETHOPRIM 800-160 MG
2 TABLET ORAL 2 TIMES DAILY
Qty: 40 TABLET | Refills: 0 | Status: SHIPPED | OUTPATIENT
Start: 2022-10-07 | End: 2022-10-15

## 2022-10-07 ASSESSMENT — ENCOUNTER SYMPTOMS
ARTHRALGIAS: 1
FEVER: 0

## 2022-10-07 ASSESSMENT — ACTIVITIES OF DAILY LIVING (ADL): ADLS_ACUITY_SCORE: 35

## 2022-10-08 NOTE — ED TRIAGE NOTES
Squeezed a pimple type bump on his right elbow.  It is now red, swollen, and painful.     Triage Assessment     Row Name 10/07/22 1525       Triage Assessment (Adult)    Airway WDL WDL       Respiratory WDL    Respiratory WDL WDL       Skin Circulation/Temperature WDL    Skin Circulation/Temperature WDL WDL       Cardiac WDL    Cardiac WDL WDL       Peripheral/Neurovascular WDL    Peripheral Neurovascular WDL WDL       Cognitive/Neuro/Behavioral WDL    Cognitive/Neuro/Behavioral WDL WDL

## 2022-10-08 NOTE — ED PROVIDER NOTES
History   Chief Complaint:  Elbow Pain       HPI   Mario Lester is a 47 year old male with history of hypertension who presents with the acute onset of pain in his left elbow about 2 hours ago. He felt like he had a zit on his elbow and went to pop it, and his pain followed this. He is able to move his elbow and reports no changes to his sensation. He denies fever.     Review of Systems   Constitutional: Negative for fever.   Musculoskeletal: Positive for arthralgias.   All other systems reviewed and are negative.      Allergies:  No Known Allergies    Medications:  The patient denies taking any medications.     Past Medical History:     Hypertension   Pulmonary nodule   Alcohol abuse   Depression  Chemical dependency     Social History:  The patient presents to the ED by means of car.   PCP: Hiren Stephenson       Physical Exam     Patient Vitals for the past 24 hrs:   BP Temp Temp src Pulse Resp SpO2   10/07/22 2146 (!) 157/93 97.7  F (36.5  C) Temporal 67 16 99 %       Physical Exam  VS: Reviewed per above  HENT: normal speech  EYES: sclera anicteric  CV: Rate as noted, regular rhythm.   RESP: Effort normal.   NEURO: Alert, moving all extremities, sensation intact to light touch in the right upper extremity distally.  5-5 strength in right upper extremity.  MSK: No deformity of the extremities, mild redness and warmth and tenderness about the right olecranon bursa region.  Able to active and passively range the right elbow without significant discomfort.  Intact right radial pulse.  SKIN: Warm and dry      Emergency Department Course     Emergency Department Course:  Reviewed:  I reviewed nursing notes, vitals, past medical history and Care Everywhere    Assessments:  2323 I obtained history and examined the patient as noted above.     Disposition:  The patient was discharged to home.     Impression & Plan     Medical Decision Making:  Patient presents to the ER for evaluation of acute onset pain and redness  "and warmth of the right elbow after attempting to \"popped a zit\" in this region.  Vital signs reassuring.  History not supportive of occult elbow fracture or dislocation.  X-ray imaging not pursued.  There is some mild redness and warmth of the olecranon bursa region.  No clinical joint effusion.  Low suspicion for septic arthropathy.  Will provide antibiotics for possible infectious component of olecranon bursitis.  Recommended rest and compression and follow-up in primary care.  Return precaution discussed prior to discharge.    Diagnosis:    ICD-10-CM    1. Olecranon bursitis of right elbow  M70.21        Discharge Medications:  Discharge Medication List as of 10/7/2022 11:38 PM      START taking these medications    Details   cephALEXin (KEFLEX) 500 MG capsule Take 1 capsule (500 mg) by mouth 4 times daily for 10 days, Disp-40 capsule, R-0, E-Prescribe      sulfamethoxazole-trimethoprim (BACTRIM DS) 800-160 MG tablet Take 2 tablets by mouth 2 times daily for 10 days, Disp-40 tablet, R-0, E-Prescribe             Scribe Disclosure:  I, Joshua Kjer, am serving as a scribe at 11:23 PM on 10/7/2022 to document services personally performed by Grover Charles MD based on my observations and the provider's statements to me.            Grover Charles MD  10/08/22 0103    "

## 2022-10-15 ENCOUNTER — HOSPITAL ENCOUNTER (EMERGENCY)
Facility: CLINIC | Age: 47
Discharge: HOME | End: 2022-10-15
Attending: EMERGENCY MEDICINE | Admitting: EMERGENCY MEDICINE
Payer: COMMERCIAL

## 2022-10-15 VITALS
TEMPERATURE: 98.6 F | DIASTOLIC BLOOD PRESSURE: 89 MMHG | BODY MASS INDEX: 33.6 KG/M2 | HEART RATE: 96 BPM | HEIGHT: 71 IN | SYSTOLIC BLOOD PRESSURE: 146 MMHG | WEIGHT: 240 LBS | OXYGEN SATURATION: 98 % | RESPIRATION RATE: 16 BRPM

## 2022-10-15 DIAGNOSIS — L27.0 ALLERGIC DRUG RASH: ICD-10-CM

## 2022-10-15 PROCEDURE — 250N000012 HC RX MED GY IP 250 OP 636 PS 637: Performed by: EMERGENCY MEDICINE

## 2022-10-15 PROCEDURE — 99283 EMERGENCY DEPT VISIT LOW MDM: CPT

## 2022-10-15 PROCEDURE — 250N000013 HC RX MED GY IP 250 OP 250 PS 637: Performed by: EMERGENCY MEDICINE

## 2022-10-15 RX ORDER — PREDNISONE 20 MG/1
60 TABLET ORAL DAILY
Qty: 12 TABLET | Refills: 0 | Status: SHIPPED | OUTPATIENT
Start: 2022-10-15 | End: 2022-10-19

## 2022-10-15 RX ORDER — DIPHENHYDRAMINE HCL 25 MG
25 CAPSULE ORAL ONCE
Status: COMPLETED | OUTPATIENT
Start: 2022-10-15 | End: 2022-10-15

## 2022-10-15 RX ORDER — PREDNISONE 20 MG/1
60 TABLET ORAL ONCE
Status: COMPLETED | OUTPATIENT
Start: 2022-10-15 | End: 2022-10-15

## 2022-10-15 RX ADMIN — PREDNISONE 60 MG: 20 TABLET ORAL at 16:23

## 2022-10-15 RX ADMIN — DIPHENHYDRAMINE HYDROCHLORIDE 25 MG: 25 CAPSULE ORAL at 16:24

## 2022-10-15 ASSESSMENT — ENCOUNTER SYMPTOMS
ABDOMINAL PAIN: 1
FATIGUE: 1
CHILLS: 1
WHEEZING: 0

## 2022-10-15 NOTE — ED TRIAGE NOTES
Pt started taking meds for bursitis 8 days ago.  Today woke up with hives all over body.  Has been feeling fatigued for past few days.  No respiratory distress.     Triage Assessment     Row Name 10/15/22 4559       Triage Assessment (Adult)    Airway WDL WDL       Respiratory WDL    Respiratory WDL WDL       Skin Circulation/Temperature WDL    Skin Circulation/Temperature WDL --  covered in hives       Cardiac WDL    Cardiac WDL WDL       Peripheral/Neurovascular WDL    Peripheral Neurovascular WDL WDL       Cognitive/Neuro/Behavioral WDL    Cognitive/Neuro/Behavioral WDL WDL

## 2022-10-15 NOTE — ED PROVIDER NOTES
"  History   Chief Complaint:  Allergic Reaction    The history is provided by the patient.      Mario Lester is a 47 year old male, otherwise well, who presents with a full-body rash beginning this morning 8 days after the patient began a course of Keflex and Bactrim. The patient was seen here around a week ago (10/07) for olecranon bursitis of his right elbow. He endorses fatigue and chills over the course of the past 3 days that broke and was replaced by the rash today. His rash was itching slightly after getting out of the shower but he denies any persisting problems. He denies any swelling in his lips or tongue. He also denies any wheezing. Of note, the patient last took his medications around 4 hours ago today. He typically takes his Bactrim in the morning and before bed with his Keflex taken as one pill throughout the day.    Review of Systems   Constitutional: Positive for chills and fatigue.   HENT:        (-) lip swelling  (-) tongue swelling   Respiratory: Negative for wheezing.    Gastrointestinal: Positive for abdominal pain.   Skin: Positive for rash (full-body).   All other systems reviewed and are negative.    Allergies:  Keflex  Bactrim    Medications:  Keflex  Atarax  Bactroban  Bacrim    Past Medical History:     Left pulmonary nodule  Alcohol abuse  Depression  Chemical dependency  Benign essential hypertension    Social History:  The patient presents to the ED on his own.  The patient presents to the ED in a private vehicle.  PCP: No Ref-Primary, Physician     Physical Exam     Patient Vitals for the past 24 hrs:   BP Temp Pulse Resp SpO2 Height Weight   10/15/22 1638 -- -- 96 16 98 % -- --   10/15/22 1613 (!) 146/89 -- 112 -- -- -- --   10/15/22 1552 -- 98.6  F (37  C) 52 16 95 % 1.803 m (5' 11\") 108.9 kg (240 lb)       Physical Exam  Eye:  Pupils are equal, round, and reactive.  Extraocular movements intact.    ENT:  No rhinorrhea.  Moist mucus membranes.  Normal tongue and " tonsil.    Cardiac:  Regular rate and rhythm.  No murmurs, gallops, or rubs.    Pulmonary:  Clear to auscultation bilaterally.  No wheezes, rales, or rhonchi.    Abdomen:  Positive bowel sounds.  Abdomen is soft and non-distended, without focal tenderness.    Musculoskeletal:  Normal movement of all extremities without evidence for deficit.    Skin:  Warm and dry. There is a diffuse blanching serpiginous rash, chiefly to the trunk and consistent with a drug rash. No blistering or oral involvement noted. Skin over the right elbow is cool to the touch without evidence of cellulitis.    Neurologic:  Non-focal exam without asymmetric weakness or numbness.     Psychiatric:  Normal affect with appropriate interaction with examiner.      Emergency Department Course     Emergency Department Course:     Reviewed:  I reviewed nursing notes, vitals, past medical history and Care Everywhere    Assessments:  1611 I obtained history and examined the patient as noted above.   1628 I rechecked the patient and explained findings.  I believe that they are safe for discharge at this time.    Interventions:  1623 Prednisone 60mg PO  1624 Diphenhydramine 25mg PO    Disposition:  The patient was discharged to home.     Impression & Plan     Medical Decision Making:  This unfortunate gentleman who was seen 1 week ago for olecranon bursitis, placed on Keflex and Bactrim, presents to us with a diffuse body rash which began this morning.  He notes he did take his morning doses of both Keflex and Bactrim.  He feels that his elbow is improving.  The rash is diffuse, serpiginous, showing no evidence of blistering or peeling.  There is no intraoral involvement.  There is no other allergic phenomenon such as tongue swelling, lip swelling, throat tightness, or wheezing.  This all points toward a simple straightforward drug rash, more likely to be related to the Bactrim though possible to be related to the Keflex.  He was advised to stop both of  these antibiotics immediately considering that his elbow appears to be improved.  I have placed a note in his allergy list that he should not be on these medications.  I have given him a dose of prednisone we will discharge him with the same for the next 4 days.  I did  him extensively on drug rashes and that this can progress to something as serious as Olivia-Brennen syndrome.  However, he shows no evidence for this at this point and I explained that drug rashes are on a continuum, with his being on the low risk side at this point.  However, I did explain it will take at least 48 hours for the drug to get out of his system and then another 48 to 72 hours for the symptoms to completely resolve.  He was advised to watch his symptoms closely, returning to us immediately for any intraoral involvement, any blistering of the skin, fever, or any other emergent concerns.      Diagnosis:    ICD-10-CM    1. Allergic drug rash  L27.0           Discharge Medications:  Discharge Medication List as of 10/15/2022  4:35 PM      START taking these medications    Details   predniSONE (DELTASONE) 20 MG tablet Take 3 tablets (60 mg) by mouth daily for 4 days, Disp-12 tablet, R-0, E-Prescribe             Scribe Disclosure:  IMarco Hired, am serving as a scribe at 4:10 PM on 10/15/2022 to document services personally performed by Trierweiler, Chad A, MD based on my observations and the provider's statements to me.        Trierweiler, Chad A, MD  10/15/22 4021

## 2022-10-15 NOTE — DISCHARGE INSTRUCTIONS
Stop your antibiotics immediately.  It is more likely you are allergic to the Bactrim (sulfa antibiotic) then you are to the Keflex (cephalosporin antibiotic).  However, there is no way to know and you should be cautious taking either of these medications in the future.  I would recommend working through your primary doctor to determine which you are specifically allergic to.  Take the prescribed prednisone for the next 4 days.  You may also use Benadryl.  You should return to us immediately for any lip swelling, tongue swelling, difficulty breathing, or other emergent concerns.

## 2023-07-10 NOTE — PROGRESS NOTES
"  Subjective:    Mario Lester is a 45 y.o. male who presents with chief complaint of rib deformity.  He is a new patient to the clinic.  I requested his old records through care everywhere.  I reviewed CT report from 2018.    Of note, he had incidentally found pulmonary nodules, found when he was initially being evaluated for this rib concern.  He has had several CTs to monitor these.  Reviewed CT scan reports from 2018 and 2015.  2018 CT scan stated they had been stable for more than 2 years, no further follow-up necessary.  2015 CT scan was done initially solely for the lump on his rib.  No abnormal findings related to affected area.  I also reviewed office note from 10/6/2015 where he first discussed this rib lump with his PCP.    He is a non-smoker.    He continues to be bothered by this bump.  He says he feels like it is growing in size.  He thinks he has had it for 5 years.  He feels like it is \"pushing\" on his insides.  Its not painful to direct palpation, but he feels like it causes overall discomfort.  He says he feels a heaviness on the left side that he does not have on the right side.  He is wondering if he should have a biopsy of the area.  He is willing to get another CT scan, but would like to avoid this if not needed.    Patient Active Problem List   Diagnosis     Pulmonary nodule, left     History of ETOH abuse     Depression     Chemical dependency (H)     No current outpatient medications on file.     Objective:   Allergies:  Patient has no known allergies.    Vitals:  Vitals:    12/28/20 1523 12/28/20 1526   BP: (!) 169/95 (!) 159/96   Patient Site: Left Arm Left Arm   Patient Position: Sitting Sitting   Cuff Size: Adult Large Adult Large   Pulse: 99 (!) 102   Temp: 100.4  F (38  C)    TempSrc: Temporal    Weight: (!) 259 lb (117.5 kg)    Height: 6' 1\" (1.854 m)      Body mass index is 34.17 kg/m .    Vital signs reviewed.  General: Patient is alert and oriented x 3, in no apparent " The sheath was inserted into the left femoral artery. distress, appropriately groomed with normal affect, hyperverbal  Cardiac: regular rate and rhythm, no murmurs  Pulmonary: lungs clear to auscultation bilaterally, no crackles, or wheezing noted  Musculoskeletal: Pain at patient identifies an area on the left anterolateral ribs, around the sixth or seventh rib, that is the area he is concerned about, I do not palpate any abnormalities, no pain with direct palpation  Skin: Skin in affected area is healthy and normal      Assessment and Plan:   1. Rib deformity  I did not appreciate any significant abnormality today.  Patient is quite concerned, as he thinks this is growing in size and may cause other problems.  He declined a chest x-ray today.  He is wondering if he should get a biopsy of the affected area.  Referral made to [facility] for discussion of further management.  - Ambulatory referral to Orthopedics    2.  Essential hypertension.  Patient says he has been told numerous times in the past that he has high blood pressure.  He declines medication today.  He says that he will make an effort to lose weight and eat healthier.  He notes he eats almost all junk food.  I discussed with him that blood pressure like his raises the risk of heart attack and stroke.  He understands this.  He declines medicine today.  He says he wants to give himself several months to lose weight and get healthier.  I reviewed with him that it is possible that an adverse health event like a stroke could occur during the 3 months that we are waiting.  He understands this and again declines medication.    3.  History of pulmonary nodule.  Has been monitoring regularly as directed.  Stable for over 2 years.  No further follow-up needed.  See CT report from 2018.      This dictation uses voice recognition software, which may contain typographical errors.